# Patient Record
Sex: MALE | Race: WHITE | NOT HISPANIC OR LATINO | URBAN - METROPOLITAN AREA
[De-identification: names, ages, dates, MRNs, and addresses within clinical notes are randomized per-mention and may not be internally consistent; named-entity substitution may affect disease eponyms.]

---

## 2017-07-19 ENCOUNTER — HOSPITAL ENCOUNTER (EMERGENCY)
Facility: CLINIC | Age: 52
Discharge: HOME | End: 2017-07-19
Attending: FAMILY MEDICINE | Admitting: FAMILY MEDICINE
Payer: COMMERCIAL

## 2017-07-19 VITALS
DIASTOLIC BLOOD PRESSURE: 85 MMHG | TEMPERATURE: 97.8 F | SYSTOLIC BLOOD PRESSURE: 131 MMHG | HEART RATE: 77 BPM | RESPIRATION RATE: 16 BRPM | BODY MASS INDEX: 21.26 KG/M2 | WEIGHT: 157 LBS | OXYGEN SATURATION: 99 % | HEIGHT: 72 IN

## 2017-07-19 DIAGNOSIS — M54.50 CHRONIC LOW BACK PAIN WITHOUT SCIATICA, UNSPECIFIED BACK PAIN LATERALITY: ICD-10-CM

## 2017-07-19 DIAGNOSIS — F32.A DEPRESSION, UNSPECIFIED DEPRESSION TYPE: ICD-10-CM

## 2017-07-19 DIAGNOSIS — G89.29 CHRONIC LOW BACK PAIN WITHOUT SCIATICA, UNSPECIFIED BACK PAIN LATERALITY: ICD-10-CM

## 2017-07-19 LAB
AMPHETAMINES UR QL SCN: NORMAL
BARBITURATES UR QL: NORMAL
BENZODIAZ UR QL: NORMAL
CANNABINOIDS UR QL SCN: NORMAL
COCAINE UR QL: NORMAL
ETHANOL UR QL SCN: NORMAL
OPIATES UR QL SCN: NORMAL

## 2017-07-19 PROCEDURE — 80307 DRUG TEST PRSMV CHEM ANLYZR: CPT | Performed by: FAMILY MEDICINE

## 2017-07-19 PROCEDURE — 90791 PSYCH DIAGNOSTIC EVALUATION: CPT

## 2017-07-19 PROCEDURE — 99285 EMERGENCY DEPT VISIT HI MDM: CPT | Mod: 25 | Performed by: FAMILY MEDICINE

## 2017-07-19 PROCEDURE — 80320 DRUG SCREEN QUANTALCOHOLS: CPT | Performed by: FAMILY MEDICINE

## 2017-07-19 PROCEDURE — 99284 EMERGENCY DEPT VISIT MOD MDM: CPT | Mod: Z6 | Performed by: FAMILY MEDICINE

## 2017-07-19 RX ORDER — DULOXETIN HYDROCHLORIDE 20 MG/1
20 CAPSULE, DELAYED RELEASE ORAL 2 TIMES DAILY
Qty: 60 CAPSULE | Refills: 0 | Status: SHIPPED | OUTPATIENT
Start: 2017-07-19 | End: 2022-05-10

## 2017-07-19 ASSESSMENT — ENCOUNTER SYMPTOMS
DYSPHORIC MOOD: 1
NERVOUS/ANXIOUS: 1
FEVER: 0
SHORTNESS OF BREATH: 0
ABDOMINAL PAIN: 0

## 2017-07-19 NOTE — ED AVS SNAPSHOT
[facility], [place], Emergency Department    [address]    [facility] [address]    Phone:  [phone]    Fax:  [phone]                                       Uriah Mo   MRN: [id number]    Department:  [facility], Emergency Department   Date of Visit:  7/19/2017           After Visit Summary Signature Page     I have received my discharge instructions, and my questions have been answered. I have discussed any challenges I see with this plan with the nurse or doctor.    ..........................................................................................................................................  Patient/Patient Representative Signature      ..........................................................................................................................................  Patient Representative Print Name and Relationship to Patient    ..................................................               ................................................  Date                                            Time    ..........................................................................................................................................  Reviewed by Signature/Title    ...................................................              ..............................................  Date                                                            Time

## 2017-07-19 NOTE — DISCHARGE INSTRUCTIONS
Discharged with plans to start both medication and therapy as well as reestablishing with primary care doctor as discussed.

## 2017-07-19 NOTE — ED NOTES
"Pt was at the clinic due to chronic back pain. According to patient, \"I was at the clinic and explained to my Doctor the pain I have been feeling. I have had shoulder pain that radiates to my back and leg for many years. My family requested and feel Botox injection will help with my pain. My Doctor does not think that is what I need. I stated, I would jump in front of a bus due to the pain I am having. I cannot take it anymore  I was sent to the ER due to suicidal thought and I still feel I my pain cannot be controlled, I would rather die.\"  "

## 2017-07-19 NOTE — ED PROVIDER NOTES
"  History     Chief Complaint   Patient presents with     Suicidal     HPI  Uriah Mo is a 52 year old male who presents to emergency room with concerns about ongoing chronic depression and anxiety as well as chronic back pain issues.  Patient admitted his statement and his primary care clinic that he had to continue to live with this back pain that he might as well just jump into traffic.  Patient denies any actual suicidal ideation and states that he made the comment is a flippant remark and did not intend to communicate any sort of suicidal ideation.  Patient does acknowledge that he has ongoing depression and anxiety and would like therapy he also is requesting the possibility of starting medication that would help both with his chronic back pain as well as his underlying depression.    I have reviewed the Medications, Allergies, Past Medical and Surgical History, and Social History in the Epic system.    PERSONAL MEDICAL HISTORY  Past Medical History:   Diagnosis Date     Anxiety     \"As a child. Maybe it is caused by pain.\"     PAST SURGICAL HISTORY  History reviewed. No pertinent surgical history.  FAMILY HISTORY  No family history on file.  SOCIAL HISTORY  Social History   Substance Use Topics     Smoking status: Current Every Day Smoker     Packs/day: 2.00     Smokeless tobacco: Not on file      Comment: Smoked for 34 years plus     Alcohol use Yes      Comment: Whisky 6 shots/daily     MEDICATIONS  No current facility-administered medications for this encounter.      Current Outpatient Prescriptions   Medication     DULoxetine (CYMBALTA) 20 MG EC capsule     ALLERGIES  No Known Allergies      Review of Systems   Constitutional: Negative for fever.   Respiratory: Negative for shortness of breath.    Cardiovascular: Negative for chest pain.   Gastrointestinal: Negative for abdominal pain.   Psychiatric/Behavioral: Positive for dysphoric mood. The patient is nervous/anxious.    All other systems reviewed " and are negative.      Physical Exam   BP: (!) 151/100  Pulse: 75  Temp: 97.8  F (36.6  C)  Resp: 20  Height: 182.9 cm (6')  Weight: 71.2 kg (157 lb)  SpO2: 100 %  Physical Exam   Constitutional: No distress.   HENT:   Head: Atraumatic.   Mouth/Throat: Oropharynx is clear and moist. No oropharyngeal exudate.   Eyes: Pupils are equal, round, and reactive to light. No scleral icterus.   Cardiovascular: Normal heart sounds and intact distal pulses.    Pulmonary/Chest: Breath sounds normal. No respiratory distress.   Abdominal: Soft. Bowel sounds are normal. There is no tenderness.   Musculoskeletal:        Lumbar back: He exhibits pain and spasm.   Skin: Skin is warm. No rash noted. He is not diaphoretic.       ED Course     ED Course     Procedures     Patient was seen by the  please refer to their report in the notes section of the Epic chart dated 7/19/17      Critical Care time:  none      Assessments & Plan (with Medical Decision Making)       I have reviewed the nursing notes.    I have reviewed the findings, diagnosis, plan and need for follow up with the patient.  Patient with ongoing depression and chronic low back pain at this time will be started on Cymbalta we made arrangements for him to start therapy and also given him information on establishing a new primary care doctor has discussed the patient will return to the emergency room if there is any risk of harming himself.    New Prescriptions    DULOXETINE (CYMBALTA) 20 MG EC CAPSULE    Take 1 capsule (20 mg) by mouth 2 times daily       Final diagnoses:   Depression, unspecified depression type   Chronic low back pain without sciatica, unspecified back pain laterality       7/19/2017   [facility], EMERGENCY DEPARTMENT     Philipp Cabrera MD  07/19/17 [id number]

## 2017-07-19 NOTE — ED AVS SNAPSHOT
[facility], Emergency Department    [address] [place] [address]    Phone:  [phone]    Fax:  [phone]                                       Uriah Mo   MRN: [id number]    Department:  [facility], Emergency Department   Date of Visit:  7/19/2017           Patient Information     Date Of Birth          1965        Your diagnoses for this visit were:     Depression, unspecified depression type     Chronic low back pain without sciatica, unspecified back pain laterality        You were seen by Philipp Cabrera MD.      Follow-up Information     Follow up with Khari Humphrey MD.    Specialty:  Internal Medicine    Contact information:     [facility]  [address]  [facility] [address] [phone]          Follow up with Therapy as scheduled.        Discharge Instructions       Discharged with plans to start both medication and therapy as well as reestablishing with primary care doctor as discussed.    24 Hour Appointment Hotline       To make an appointment at any [facility] clinic, call [phone] ([phone]). If you don't have a family doctor or clinic, we will help you find one. [facility] clinics are conveniently located to serve the needs of you and your family.             Review of your medicines      START taking        Dose / Directions Last dose taken    DULoxetine 20 MG EC capsule   Commonly known as:  CYMBALTA   Dose:  20 mg   Quantity:  60 capsule        Take 1 capsule (20 mg) by mouth 2 times daily   Refills:  0                Prescriptions were sent or printed at these locations (1 Prescription)                   Other Prescriptions                Printed at Department/Unit printer (1 of 1)         DULoxetine (CYMBALTA) 20 MG EC capsule                Procedures and tests performed during your visit     Drug abuse screen 6 urine (chem dep)      Orders Needing Specimen Collection     None      Pending Results     Date and Time Order Name  "Status Description    2017 1642 Drug abuse screen 6 urine (chem dep) In process             Pending Culture Results     Date and Time Order Name Status Description    2017 1642 Drug abuse screen 6 urine (chem dep) In process             Pending Results Instructions     If you had any lab results that were not finalized at the time of your Discharge, you can call the ED Lab Result RN at [phone]. You will be contacted by this team for any positive Lab results or changes in treatment. The nurses are available 7 days a week from 10A to 6:30P.  You can leave a message 24 hours per day and they will return your call.        Thank you for choosing [facility]       Thank you for choosing [facility] for your care. Our goal is always to provide you with excellent care. Hearing back from our patients is one way we can continue to improve our services. Please take a few minutes to complete the written survey that you may receive in the mail after you visit with us. Thank you!        [employer] Information     [employer] lets you send messages to your doctor, view your test results, renew your prescriptions, schedule appointments and more. To sign up, go to [email or web address] . Click on \"Log in\" on the left side of the screen, which will take you to the Welcome page. Then click on \"Sign up Now\" on the right side of the page.     You will be asked to enter the access code listed below, as well as some personal information. Please follow the directions to create your username and password.     Your access code is: [email or web address]  Expires: 10/17/2017  6:13 PM     Your access code will  in 90 days. If you need help or a new code, please call your [facility] clinic or [phone].        Care EveryWhere ID     This is your Care EveryWhere ID. This could be used by other organizations to access your [facility] medical records  [id number]        Equal Access to Services     ASHOK JUAREZ: Brannon Rick, " ba aleman, sulema gonzalezmaursula ramos, tex barron ah. So [facility] [phone].    ATENCIÓN: Si habla español, tiene a oconnor disposición servicios gratuitos de asistencia lingüística. Llame al [phone].    We comply with applicable federal civil rights laws and [place] laws. We do not discriminate on the basis of race, color, national origin, age, disability sex, sexual orientation or gender identity.            After Visit Summary       This is your record. Keep this with you and show to your community pharmacist(s) and doctor(s) at your next visit.

## 2022-05-10 ENCOUNTER — OFFICE VISIT (OUTPATIENT)
Dept: INTERNAL MEDICINE | Facility: CLINIC | Age: 57
End: 2022-05-10
Payer: COMMERCIAL

## 2022-05-10 VITALS
HEART RATE: 91 BPM | WEIGHT: 169.2 LBS | SYSTOLIC BLOOD PRESSURE: 154 MMHG | BODY MASS INDEX: 22.92 KG/M2 | HEIGHT: 72 IN | DIASTOLIC BLOOD PRESSURE: 99 MMHG | OXYGEN SATURATION: 100 %

## 2022-05-10 DIAGNOSIS — G89.29 CHRONIC RIGHT SHOULDER PAIN: Primary | ICD-10-CM

## 2022-05-10 DIAGNOSIS — M54.6 CHRONIC RIGHT-SIDED THORACIC BACK PAIN: ICD-10-CM

## 2022-05-10 DIAGNOSIS — L30.9 DERMATITIS: ICD-10-CM

## 2022-05-10 DIAGNOSIS — M25.511 CHRONIC RIGHT SHOULDER PAIN: Primary | ICD-10-CM

## 2022-05-10 DIAGNOSIS — G89.29 CHRONIC RIGHT-SIDED THORACIC BACK PAIN: ICD-10-CM

## 2022-05-10 PROCEDURE — 99204 OFFICE O/P NEW MOD 45 MIN: CPT | Mod: GC

## 2022-05-10 RX ORDER — BETAMETHASONE DIPROPIONATE 0.5 MG/G
CREAM TOPICAL 2 TIMES DAILY
Qty: 45 G | Refills: 0 | Status: SHIPPED | OUTPATIENT
Start: 2022-05-10 | End: 2022-12-05 | Stop reason: ALTCHOICE

## 2022-05-10 RX ORDER — CYCLOBENZAPRINE HCL 5 MG
5 TABLET ORAL 2 TIMES DAILY PRN
Qty: 15 TABLET | Refills: 0 | Status: SHIPPED | OUTPATIENT
Start: 2022-05-10 | End: 2022-05-24

## 2022-05-10 NOTE — PROGRESS NOTES
I, Socrates Mayer MD saw the patient with the resident, and agree with the resident's findings and plan of care as documented in the resident's note.  BP (!) 154/99 (BP Location: Right arm, Patient Position: Sitting, Cuff Size: Adult Regular)   Pulse 91   Ht 1.829 m (6')   Wt 76.7 kg (169 lb 3.2 oz)   SpO2 100%   BMI 22.95 kg/m    I personally reviewed vital signs and past record.  Key findings: Right sided thoracic/shoulder pain since 2005, thought to be related to a work problem and subsequent injuries. Has seen several specialists including ortho, pain. Recently has gone to . MRI C-spine and shoulder not revealing. PT and pool therapy, but some limitations in insurance payment.  Ongoing issues with work/disability, we are unable to comment but would defer to a disability insurance.  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 Some purplish cast, but elbows are benign.

## 2022-05-10 NOTE — NURSING NOTE
Chief Complaint   Patient presents with     Recheck Medication     Musculoskeletal Problem     R shoulder/back injury in 2004; did some PT over the years, kept getting worse and no one could diagnose; Patient states there is a lot of traction the worse it gets.        Miguel Wilson, EMT at 7:27 AM on 5/10/2022.

## 2022-05-14 ENCOUNTER — HEALTH MAINTENANCE LETTER (OUTPATIENT)
Age: 57
End: 2022-05-14

## 2022-05-19 ENCOUNTER — ANCILLARY PROCEDURE (OUTPATIENT)
Dept: GENERAL RADIOLOGY | Facility: CLINIC | Age: 57
End: 2022-05-19
Attending: PEDIATRICS
Payer: COMMERCIAL

## 2022-05-19 ENCOUNTER — TELEPHONE (OUTPATIENT)
Dept: INTERNAL MEDICINE | Facility: CLINIC | Age: 57
End: 2022-05-19

## 2022-05-19 ENCOUNTER — ANCILLARY PROCEDURE (OUTPATIENT)
Dept: MRI IMAGING | Facility: CLINIC | Age: 57
End: 2022-05-19
Attending: PEDIATRICS
Payer: COMMERCIAL

## 2022-05-19 DIAGNOSIS — G89.29 CHRONIC RIGHT SHOULDER PAIN: ICD-10-CM

## 2022-05-19 DIAGNOSIS — M25.511 CHRONIC RIGHT SHOULDER PAIN: ICD-10-CM

## 2022-05-19 DIAGNOSIS — G89.29 CHRONIC RIGHT-SIDED THORACIC BACK PAIN: ICD-10-CM

## 2022-05-19 DIAGNOSIS — M54.6 CHRONIC RIGHT-SIDED THORACIC BACK PAIN: ICD-10-CM

## 2022-05-19 LAB — RADIOLOGIST FLAGS: NORMAL

## 2022-05-19 PROCEDURE — A9585 GADOBUTROL INJECTION: HCPCS | Performed by: STUDENT IN AN ORGANIZED HEALTH CARE EDUCATION/TRAINING PROGRAM

## 2022-05-19 PROCEDURE — 73030 X-RAY EXAM OF SHOULDER: CPT | Mod: RT | Performed by: RADIOLOGY

## 2022-05-19 PROCEDURE — 72157 MRI CHEST SPINE W/O & W/DYE: CPT | Performed by: STUDENT IN AN ORGANIZED HEALTH CARE EDUCATION/TRAINING PROGRAM

## 2022-05-19 RX ORDER — GADOBUTROL 604.72 MG/ML
7.5 INJECTION INTRAVENOUS ONCE
Status: COMPLETED | OUTPATIENT
Start: 2022-05-19 | End: 2022-05-19

## 2022-05-19 RX ADMIN — GADOBUTROL 7.5 ML: 604.72 INJECTION INTRAVENOUS at 09:59

## 2022-05-19 NOTE — TELEPHONE ENCOUNTER
[facility] Call Center    Phone Message    May a detailed message be left on voicemail: yes     Reason for Call: Other: Kari calling from imaging due to an incidental finding on the MRI of thoracic spine. Please call back to discuss ASAP. Thank you     Action Taken: Message routed to:  [facility]    Travel Screening: Not Applicable

## 2022-05-19 NOTE — DISCHARGE INSTRUCTIONS
MRI Contrast Discharge Instructions    The IV contrast you received today will pass out of your body in your  urine. This will happen in the next 24 hours. You will not feel this process.  Your urine will not change color.    Drink at least 4 extra glasses of water or juice today (unless your doctor  has restricted your fluids). This reduces the stress on your kidneys.  You may take your regular medicines.    If you are on dialysis: It is best to have dialysis today.    If you have a reaction: Most reactions happen right away. If you have  any new symptoms after leaving the hospital (such as hives or swelling),  call your hospital at the correct number below. Or call your family doctor.  If you have breathing distress or wheezing, call 911.    Special instructions: ***    I have read and understand the above information.    Signature:______________________________________ Date:___________    Staff:__________________________________________ Date:___________     Time:__________    [facility] Departments:    ___[place]: [phone]  ___[facility]: [phone]  ___[place]: [phone] ___[facility]: [phone]  ___[facility]: [phone]  ___[facility]: [phone]  ___[phone]  ___[facility]: [phone]  ___[phone]

## 2022-05-22 NOTE — PROGRESS NOTES
PRIMARY CARE CENTER     Patient Name: Uriah Mo  YOB: 1965  MRN: [id number]    Date of Service: May 24, 2022  Chief Complaint: back pain f/u         HISTORY OF PRESENT ILLNESS:    Dorian Mo is a 57 yo man with PMHx depression, anxiety, chronic R-sided shoulder/back pain who presents today from f/u.     He was last seen by me 5/10/2022 for this chronic R shoulder, upper back, and chest wall pain/weakness after a remote work injury with subsequent worsening/re-injuries over the year. Please see 5/10 office visit for more details. At that time, we opted to move forward with thoracic spine MRI and R shoulder XR, and provided Rxs for voltaren gel and cyclobenzaprine.    The MRI thoracic spine showed a normal thoracic spine MRI without MRI finding to explain back pain. The right shoulder XR showed mild degenerative changes of his AC joint as well as a 8 mm subacromial enthesophyte. There was no substantial glenohumeral joint degenerative changes.    Of note, the MRI also caught an incidental finding of a well-defined enhancing T2 hyperintense lesion in the liver that is more likely a benign hemangioma but they recommended a non-emergent ultrasound for confirmation. Discussed with patient and he is in agreement with this plan.     Since our last appointment, Dorian feels improved. He is very relieved there was nothing concerning on MRI or XR. He started his own stretching routine that focuses on back extension and shoulder abduction then posterior rotation, as well as cat & cow exercises. He is unsure if muscle relaxant helping or not, if it is allowing him to stretch more versus it is not changing much. He would like to continue using it, has been using it twice per day.     He feels his rash is improved, thinks it could be related to not irritating the area so much. He reports similar rash on his scalp in an area he also frequently itches. It looks similar to the lesion that was on his arm.     He  "also has a skin lesion under his R eye he was concerned about. He reports it has previously flaked off but is still present. No bleeding.     Medications and allergies reviewed by me today.          PAST MEDICAL HISTORY:     Past Medical History:   Diagnosis Date     Anxiety     \"As a child. Maybe it is caused by pain.\"            REVIEW OF SYSTEMS:     10 point ROS was negative except as noted in HPI         HOME MEDICATIONS:     Current Outpatient Medications   Medication     betamethasone dipropionate (DIPROSONE) 0.05 % external cream     cyclobenzaprine (FLEXERIL) 5 MG tablet     diclofenac (VOLTAREN) 1 % topical gel     No current facility-administered medications for this visit.            PHYSICAL EXAM:   Vitals: BP (!) 138/96 (BP Location: Right arm, Patient Position: Sitting, Cuff Size: Adult Regular)   Pulse 95   Resp 16   Ht 1.829 m (6')   Wt 78.2 kg (172 lb 6.4 oz)   SpO2 97%   BMI 23.38 kg/m       Wt Readings from Last 4 Encounters:   05/10/22 76.7 kg (169 lb 3.2 oz)   07/19/17 71.2 kg (157 lb)       GENERAL: Alert, interactive, NAD  HEENT: NCAT, EOMI  LUNGS: Breathing comfortably on RA  HEART: regular rate, WWP  MUSCULOSKELETAL: normal spinal extension ROM  EXTREMITIES: No LE edema bilaterally  SKIN: Warm and dry. Has flat irregularly shaped light brown coored lesion <1 cm in size under R eye. Inflamed skin on back of head at base of neck with excoriations.   NEURO: A&O, moving all 4 limbs spontaneously   PSYCH: Appropriate mood, normal speech, linear thought.            DATA:     Laboratory Data:  No relevant lab data.     Imaging:  MRI thoracic w/wo contrast 5/19 without spinal abnormality. Incidental finding of liver lesion likely representing benign hemangioma.    Shoulder XR 5/19 with mild AC degenerative changes, 8 mm bone spur.           ASSESSMENT & PLAN:     Uriah was seen today for recheck and results.    Diagnoses and all orders for this visit:    Probable benign " hemangioma  Incidental finding on MRI thoracic spine of probable benign hemangioma. Radiologist suggested abdominal US to confirm diagnosis.  - Abdominal US limited    Chronic right-sided thoracic back pain  Improved from prior. Patient would like to continue with cyclobenzaprine. Recommended using it only at night PRN instead of BID. He would prefer to continue working on exercises alone rather than a PT referral. Will contact clinic if he changes his mind. Provided patient education on hip & low back exercises per [employer].   -     cyclobenzaprine (FLEXERIL) 5 MG tablet; Take 1 tablet (5 mg) by mouth nightly as needed for muscle spasms    Skin lesion  Has flat seborrheic keratosis appearing lesion under his R eye with irregular border. Low suspicion for malignancy at this time but would recommend Dermatology referral for further evaluation and management.   -     Adult Dermatology Referral    Dermatitis  Similar dermatitis appearing lesion in area of frequent itching at base of neck under hair. Recommended mometasone until lesion improves.   -     mometasone (ELOCON) 0.1 % external solution; Apply topically daily    Healthcare Maintenance:  Dorian has not had a routine preventive visit in >5 years per patient. He would like to focus on his back pain improving at this time but will call to schedule a preventive visit in the near future    Return to clinic: when able for annual visit    Patient care plan discussed with attending physician, Dr. Mayer, who agreed with above.    Lazara Lewis MD  Internal Medicine, PGY-1

## 2022-05-24 ENCOUNTER — OFFICE VISIT (OUTPATIENT)
Dept: INTERNAL MEDICINE | Facility: CLINIC | Age: 57
End: 2022-05-24

## 2022-05-24 ENCOUNTER — ANCILLARY PROCEDURE (OUTPATIENT)
Dept: ULTRASOUND IMAGING | Facility: CLINIC | Age: 57
End: 2022-05-24
Attending: PEDIATRICS
Payer: COMMERCIAL

## 2022-05-24 VITALS
RESPIRATION RATE: 16 BRPM | OXYGEN SATURATION: 97 % | WEIGHT: 172.4 LBS | DIASTOLIC BLOOD PRESSURE: 96 MMHG | HEIGHT: 72 IN | BODY MASS INDEX: 23.35 KG/M2 | SYSTOLIC BLOOD PRESSURE: 138 MMHG | HEART RATE: 95 BPM

## 2022-05-24 DIAGNOSIS — M54.6 CHRONIC RIGHT-SIDED THORACIC BACK PAIN: ICD-10-CM

## 2022-05-24 DIAGNOSIS — K76.9 LESION OF LIVER: ICD-10-CM

## 2022-05-24 DIAGNOSIS — L98.9 SKIN LESION: ICD-10-CM

## 2022-05-24 DIAGNOSIS — L30.9 DERMATITIS: ICD-10-CM

## 2022-05-24 DIAGNOSIS — K76.9 LESION OF LIVER: Primary | ICD-10-CM

## 2022-05-24 DIAGNOSIS — G89.29 CHRONIC RIGHT-SIDED THORACIC BACK PAIN: ICD-10-CM

## 2022-05-24 PROCEDURE — 76705 ECHO EXAM OF ABDOMEN: CPT | Mod: GC | Performed by: RADIOLOGY

## 2022-05-24 PROCEDURE — 99214 OFFICE O/P EST MOD 30 MIN: CPT | Mod: GC

## 2022-05-24 RX ORDER — MOMETASONE FUROATE 1 MG/ML
SOLUTION TOPICAL DAILY
Qty: 60 ML | Refills: 0 | Status: SHIPPED | OUTPATIENT
Start: 2022-05-24 | End: 2022-12-05 | Stop reason: ALTCHOICE

## 2022-05-24 RX ORDER — CYCLOBENZAPRINE HCL 5 MG
5 TABLET ORAL
Qty: 15 TABLET | Refills: 0 | Status: SHIPPED | OUTPATIENT
Start: 2022-05-24

## 2022-05-24 ASSESSMENT — PAIN SCALES - GENERAL: PAINLEVEL: EXTREME PAIN (8)

## 2022-05-24 NOTE — PATIENT INSTRUCTIONS
Hip & back exercises:  https://orthoinfo.aaos.org/en/recovery/hip-conditioning-program/    https://orthoinfo.aaos.org/globalassets/pdfs/2017-rehab_spine.pdf

## 2022-05-24 NOTE — PROGRESS NOTES
I, Socrates Mayer MD saw the patient with the resident, and agree with the resident's findings and plan of care as documented in the resident's note.  BP (!) 138/96 (BP Location: Right arm, Patient Position: Sitting, Cuff Size: Adult Regular)   Pulse 95   Resp 16   Ht 1.829 m (6')   Wt 78.2 kg (172 lb 6.4 oz)   SpO2 97%   BMI 23.38 kg/m    I personally reviewed vital signs and past record.  Key findings: Following up from complex visit recently.  Imaging of thoracic spine reliable. Needs repeat of incidental finding in liver.  XR of shoulder showed bony spur.   OK to take muscle relaxers at night to recovery.

## 2022-05-24 NOTE — Clinical Note
Julio Haile, Just a reminder to code level 4 LOS for patients with multiple problems and Rx meds. Otherwise, keep up the good work! Dionte

## 2022-05-24 NOTE — NURSING NOTE
Uriah Mo is a 56 year old male patient that presents today in clinic for the following:    Chief Complaint   Patient presents with     RECHECK     Pt comes into clinic for a follow up     Results     Discuss imaging results     The patient's allergies and medications were reviewed as noted. A set of vitals were recorded as noted without incident. The patient does not have any other questions for the provider.    Alexandra Casiano, EMT at 12:51 PM on 5/24/2022

## 2022-06-04 ENCOUNTER — TELEPHONE (OUTPATIENT)
Dept: INTERNAL MEDICINE | Facility: CLINIC | Age: 57
End: 2022-06-04

## 2022-08-08 ENCOUNTER — TELEPHONE (OUTPATIENT)
Dept: INTERNAL MEDICINE | Facility: CLINIC | Age: 57
End: 2022-08-08

## 2022-08-08 DIAGNOSIS — G89.29 CHRONIC RIGHT-SIDED THORACIC BACK PAIN: ICD-10-CM

## 2022-08-08 DIAGNOSIS — M54.6 CHRONIC RIGHT-SIDED THORACIC BACK PAIN: ICD-10-CM

## 2022-08-08 NOTE — TELEPHONE ENCOUNTER
[facility] Call Center    Phone Message    May a detailed message be left on voicemail: yes     Reason for Call: Order(s): Other: xray  Reason for requested: back and shoulder pain  Date needed: asap  Provider name: Dr. Lewis    Patient reporting his shoulder issues are still bothering him. Muscle relaxants not helping and might be making it worse. Wanting x-ray results from [facility] to be reviewed and wondering about getting another x-ray to compare how he has progressed. Patient noted that the Pain specialist at [facility] looked at his T4 and T5. Patient noted at the last visit, Dr. Lewis looked more at T1 and T2.       Action Taken: Message routed to:  [facility]    Travel Screening: Not Applicable

## 2022-08-09 NOTE — TELEPHONE ENCOUNTER
Patient says that he feels lightheaded from pain sometimes and it radiates to hip. Exacerbated sleeping on back. Sleeping on side can help but had stoped taking flexeril and ibuprofen but writer recommended for him to start back up. 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 Will reorder. Had incident at [employer] on right shoulder. Wants to pursue xray- concerned about a cyst or back injury. Endorse rashes and linear bruises sometimes on arms when he injures himself.     RN encouraged patient to discuss these concerns with provider when he visits, and will follow up with RN if his pain gets worse.     Jacobo Navarro RN on 8/9/2022 at 1:56 PM

## 2022-08-09 NOTE — TELEPHONE ENCOUNTER
Patient had xray shoulder and xray thoracic spine in 2017 at [facility].    Dr. Lewis placed order for xray shoulder and MRI thoracic spine in May, 2022.  These results was discussed at his visit with Dr. Lewis in May.   Not sure why patient is wanting another imaging recheck when this was done 3 months ago.    Patient has a future appointment with Dr. Lewis on 08/23/22.    Message sent to RN to advise his shoulder pain until his appointment.  Flexeril not working.          Sanjeev Hope CMA ([facility]) at 10:11 AM on 8/9/2022

## 2022-08-22 NOTE — PROGRESS NOTES
"  [facility]     Patient Name: Uriah Mo  YOB: 1965  MRN: [id number]    Date of Service: 2022    Chief Complaint: \"Annual physical, back and shoulder issues still bothering him, muscle relaxants not helping and might be making it worse, wanting x rays from [facility] to be reviewed, pain specialist in the past looked at his T4 and T5, wondering about getting another x ray to compare how he has progressed per patient\"         HISTORY OF PRESENT ILLNESS:    Dorian Mo is a 56 yo man with PMHx depression, anxiety, chronic R-sided shoulder/back pain who presents today for annual visit and follow-up on back and shoulder issues.    Regarding his back, he reports the same pain is getting worse again. No re-injury. He wonders if it could be rooted in issues he had with a lawn  he had as a child that started his pain, and then continued to get re-injured in his job as previously discussed. Dorian thinks he has 6 months of pool therapy still through [facility]. However, agreed to PT referral in case this has .     Otherwise, would like a preventive visit today.   Healthcare Maintenance Due  - Colon cancer screening -- pt prefers FIT testing. Colon cancer doesn't run in family.   - Labs: HIV screening, HCV screening, lipid profile (has been fasting 18 hours)  - Vaccines: Pneumonia vaccine (had PPSV23 2011); COVID booster (Moderna); Tdap  - Zoster vaccine - previously had shingles ~age 32. Recommended getting Shingrix when able at his local pharmacy  - Lung cancer screening: currently smokes 0.5 PPD   - Discussed smoking cessation -- pt would like to discuss more with his girlfriend as he thinks they should both try to quit together. She previously had success with Chantix. He will reach out if he would like to pursue smoking cessation or else we will discuss more at our next visit.     Medications and allergies reviewed by me today.          PAST MEDICAL HISTORY: " "    Past Medical History:   Diagnosis Date     Anxiety     \"As a child. Maybe it is caused by pain.\"            REVIEW OF SYSTEMS:     10 point ROS was negative except as noted in HPI         HOME MEDICATIONS:     Current Outpatient Medications   Medication     betamethasone dipropionate (DIPROSONE) 0.05 % external cream     cyclobenzaprine (FLEXERIL) 5 MG tablet     diclofenac (VOLTAREN) 1 % topical gel     mometasone (ELOCON) 0.1 % external solution     No current facility-administered medications for this visit.            PHYSICAL EXAM:   Vitals: BP (!) 151/95 (BP Location: Right arm, Patient Position: Sitting, Cuff Size: Adult Regular)   Pulse 96   Resp 18   Ht 1.829 m (6')   Wt 74.4 kg (164 lb)   SpO2 98%   BMI 22.24 kg/m      Wt Readings from Last 4 Encounters:   05/24/22 78.2 kg (172 lb 6.4 oz)   05/10/22 76.7 kg (169 lb 3.2 oz)   07/19/17 71.2 kg (157 lb)     GENERAL: Alert, interactive, NAD  HEENT: NCAT, EOMI  LUNGS: clear to auscultation bilaterally, no crackles or wheezes  HEART: regular rate and rhythm, normal S1 and S2, no murmur appreciated  EXTREMITIES: No LE edema bilaterally  SKIN: Warm and dry, no jaundice or acute rashes  NEURO: A&O, moving all 4 limbs spontaneously   PSYCH: Appropriate mood, normal speech, linear thought.            DATA:     Laboratory Data & Imaging: no recent labs/imaging           ASSESSMENT & PLAN:     Uriah was seen today for physical and musculoskeletal problem.    Diagnoses and all orders for this visit:    Chronic right-sided thoracic back pain  Continue to have his R-sided thoracic pain. Recently had the thoracic MRI that was normal, provided reassurance. Recommended he try PT for his pain. Also offered referral to ortho/PM&R if pain does not improve. He thinks he may still have 6 months of pool therapy, or else he will pursue PT. He will let us know if he would like a specialist referral if pain doesn't improve.   -     Physical Therapy Referral; " Future    Screening for diabetes mellitus  -     Hemoglobin A1c; Future    Elevated blood pressure reading without diagnosis of hypertension  -     Comprehensive metabolic panel; Future    Screening for hyperlipidemia  -     Lipid panel reflex to direct LDL Fasting; Future    Screening for HIV (human immunodeficiency virus)  -     HIV Antigen Antibody Combo; Future    Encounter for HCV screening test for low risk patient  -     HCV Screen with Reflex; Future    Colon cancer screening  Pt would like to avoid colonoscopy as able. Informed him he would need a colonoscopy if FIT was positive, he was ok with this.   -     Fecal cancer screen FIT; Future    Current tobacco use  Encounter for screening for lung cancer  Pt currently smokes 0.5 PPD. Asked if pt was interested in smoking cessation. He will discuss with his girlfriend, as she smokes too, and see if they would like to quit together. His girlfriend has tried Chantix in the past with success. He will reach out if in the meantime he would like to pursue smoking cessation.   -     CT Chest Lung Cancer Scrn Low Dose wo; Future    Other orders  -     TDAP VACCINE (Adacel, Boostrix)  [[id number]]  -     PNEUMOCOCCAL 20 VALENT CONJUGATE (PREVNAR 20)  -     COVID-19,PF,MODERNA (18+ YRS BOOSTER .25ML)      Return to clinic: 1 year for annual visit or sooner if concerns arise     Patient care plan discussed with attending physician, Dr. Abarca, who agreed with above.    Lazara Lewis MD  PGY-2  Internal Medicine

## 2022-08-23 ENCOUNTER — OFFICE VISIT (OUTPATIENT)
Dept: INTERNAL MEDICINE | Facility: CLINIC | Age: 57
End: 2022-08-23
Payer: COMMERCIAL

## 2022-08-23 ENCOUNTER — ANCILLARY PROCEDURE (OUTPATIENT)
Dept: CT IMAGING | Facility: CLINIC | Age: 57
End: 2022-08-23
Attending: INTERNAL MEDICINE
Payer: COMMERCIAL

## 2022-08-23 ENCOUNTER — LAB (OUTPATIENT)
Dept: LAB | Facility: CLINIC | Age: 57
End: 2022-08-23

## 2022-08-23 VITALS
HEART RATE: 96 BPM | RESPIRATION RATE: 18 BRPM | SYSTOLIC BLOOD PRESSURE: 151 MMHG | WEIGHT: 164 LBS | DIASTOLIC BLOOD PRESSURE: 95 MMHG | BODY MASS INDEX: 22.21 KG/M2 | OXYGEN SATURATION: 98 % | HEIGHT: 72 IN

## 2022-08-23 DIAGNOSIS — Z11.59 ENCOUNTER FOR HCV SCREENING TEST FOR LOW RISK PATIENT: ICD-10-CM

## 2022-08-23 DIAGNOSIS — Z12.11 COLON CANCER SCREENING: ICD-10-CM

## 2022-08-23 DIAGNOSIS — Z13.1 SCREENING FOR DIABETES MELLITUS: ICD-10-CM

## 2022-08-23 DIAGNOSIS — Z12.2 ENCOUNTER FOR SCREENING FOR LUNG CANCER: ICD-10-CM

## 2022-08-23 DIAGNOSIS — R03.0 ELEVATED BLOOD PRESSURE READING WITHOUT DIAGNOSIS OF HYPERTENSION: ICD-10-CM

## 2022-08-23 DIAGNOSIS — G89.29 CHRONIC RIGHT-SIDED THORACIC BACK PAIN: Primary | ICD-10-CM

## 2022-08-23 DIAGNOSIS — M54.6 CHRONIC RIGHT-SIDED THORACIC BACK PAIN: Primary | ICD-10-CM

## 2022-08-23 DIAGNOSIS — Z11.4 SCREENING FOR HIV (HUMAN IMMUNODEFICIENCY VIRUS): ICD-10-CM

## 2022-08-23 DIAGNOSIS — Z13.220 SCREENING FOR HYPERLIPIDEMIA: ICD-10-CM

## 2022-08-23 LAB
ALBUMIN SERPL-MCNC: 4.1 G/DL (ref 3.4–5)
ALP SERPL-CCNC: 57 U/L (ref 40–150)
ALT SERPL W P-5'-P-CCNC: 36 U/L (ref 0–70)
ANION GAP SERPL CALCULATED.3IONS-SCNC: 7 MMOL/L (ref 3–14)
AST SERPL W P-5'-P-CCNC: 31 U/L (ref 0–45)
BILIRUB SERPL-MCNC: 0.6 MG/DL (ref 0.2–1.3)
BUN SERPL-MCNC: 13 MG/DL (ref 7–30)
CALCIUM SERPL-MCNC: 9.2 MG/DL (ref 8.5–10.1)
CHLORIDE BLD-SCNC: 105 MMOL/L (ref 94–109)
CHOLEST SERPL-MCNC: 244 MG/DL
CO2 SERPL-SCNC: 28 MMOL/L (ref 20–32)
CREAT SERPL-MCNC: 0.82 MG/DL (ref 0.66–1.25)
FASTING STATUS PATIENT QL REPORTED: YES
GFR SERPL CREATININE-BSD FRML MDRD: >90 ML/MIN/1.73M2
GLUCOSE BLD-MCNC: 85 MG/DL (ref 70–99)
HBA1C MFR BLD: 5.2 % (ref 0–5.6)
HDLC SERPL-MCNC: 136 MG/DL
LDLC SERPL CALC-MCNC: 94 MG/DL
NONHDLC SERPL-MCNC: 108 MG/DL
POTASSIUM BLD-SCNC: 4.7 MMOL/L (ref 3.4–5.3)
PROT SERPL-MCNC: 8 G/DL (ref 6.8–8.8)
SODIUM SERPL-SCNC: 140 MMOL/L (ref 133–144)
TRIGL SERPL-MCNC: 69 MG/DL

## 2022-08-23 PROCEDURE — 90677 PCV20 VACCINE IM: CPT

## 2022-08-23 PROCEDURE — 91306 COVID-19,PF,MODERNA (18+ YRS BOOSTER .25ML): CPT

## 2022-08-23 PROCEDURE — 99396 PREV VISIT EST AGE 40-64: CPT | Mod: 25

## 2022-08-23 PROCEDURE — 90715 TDAP VACCINE 7 YRS/> IM: CPT

## 2022-08-23 PROCEDURE — 90471 IMMUNIZATION ADMIN: CPT

## 2022-08-23 PROCEDURE — 87389 HIV-1 AG W/HIV-1&-2 AB AG IA: CPT | Mod: 90 | Performed by: PATHOLOGY

## 2022-08-23 PROCEDURE — 90472 IMMUNIZATION ADMIN EACH ADD: CPT

## 2022-08-23 PROCEDURE — 80053 COMPREHEN METABOLIC PANEL: CPT | Performed by: PATHOLOGY

## 2022-08-23 PROCEDURE — 0064A COVID-19,PF,MODERNA (18+ YRS BOOSTER .25ML): CPT

## 2022-08-23 PROCEDURE — 99000 SPECIMEN HANDLING OFFICE-LAB: CPT | Performed by: PATHOLOGY

## 2022-08-23 PROCEDURE — 83036 HEMOGLOBIN GLYCOSYLATED A1C: CPT | Performed by: PATHOLOGY

## 2022-08-23 PROCEDURE — 86803 HEPATITIS C AB TEST: CPT | Mod: 90 | Performed by: PATHOLOGY

## 2022-08-23 PROCEDURE — 80061 LIPID PANEL: CPT | Performed by: PATHOLOGY

## 2022-08-23 PROCEDURE — 71271 CT THORAX LUNG CANCER SCR C-: CPT | Performed by: RADIOLOGY

## 2022-08-23 PROCEDURE — 36415 COLL VENOUS BLD VENIPUNCTURE: CPT | Performed by: PATHOLOGY

## 2022-08-23 NOTE — NURSING NOTE
Uriah Mo received the TDAP, Prevnar 20, Covid Moderna booster vaccines in clinic today at the request of Dr. Lewis. The immunization sites were cleaned with an alcohol prep wipe. The immunizations were given without incident--see immunization list for administration details. No swelling or redness was observed at the sites of injection after the immunizations were given. Pt has no history of reaction to vaccines. Pt was advised to remain in [facility] lobby for 15 minutes in case of an adverse reaction.      This service provided today was under the direct supervision of Dr. Castrejon, who was available if needed.    Alexandra Casiano, EMT at 9:57 AM on 8/23/2022

## 2022-08-23 NOTE — NURSING NOTE
Uriah Mo is a 57 year old male patient that presents today in clinic for the following:    Chief Complaint   Patient presents with     Physical     Pt here for annual physical     Musculoskeletal Problem     Pt complains of back and shoulder issues; would like to discuss imaging     The patient's allergies and medications were reviewed as noted. A set of vitals were recorded as noted without incident. The patient does not have any other questions for the provider.    AUGUSTO Henley at 8:16 AM on 8/23/2022

## 2022-08-24 LAB
HCV AB SERPL QL IA: NONREACTIVE
HIV 1+2 AB+HIV1 P24 AG SERPL QL IA: NONREACTIVE

## 2022-08-30 RX ORDER — CYCLOBENZAPRINE HCL 5 MG
5 TABLET ORAL
Qty: 30 TABLET | Refills: 0 | Status: CANCELLED | OUTPATIENT
Start: 2022-08-30

## 2022-09-03 ENCOUNTER — HEALTH MAINTENANCE LETTER (OUTPATIENT)
Age: 57
End: 2022-09-03

## 2022-10-11 ENCOUNTER — HOSPITAL ENCOUNTER (OUTPATIENT)
Dept: PHYSICAL THERAPY | Facility: REHABILITATION | Age: 57
Discharge: HOME | End: 2022-10-11
Attending: INTERNAL MEDICINE
Payer: COMMERCIAL

## 2022-10-11 DIAGNOSIS — M25.511 CHRONIC RIGHT SHOULDER PAIN: ICD-10-CM

## 2022-10-11 DIAGNOSIS — M54.6 CHRONIC RIGHT-SIDED THORACIC BACK PAIN: ICD-10-CM

## 2022-10-11 DIAGNOSIS — M54.50 CHRONIC RIGHT-SIDED LOW BACK PAIN WITHOUT SCIATICA: Primary | ICD-10-CM

## 2022-10-11 DIAGNOSIS — G89.29 CHRONIC RIGHT-SIDED LOW BACK PAIN WITHOUT SCIATICA: Primary | ICD-10-CM

## 2022-10-11 DIAGNOSIS — G89.29 CHRONIC RIGHT SHOULDER PAIN: ICD-10-CM

## 2022-10-11 DIAGNOSIS — G89.29 CHRONIC RIGHT-SIDED THORACIC BACK PAIN: ICD-10-CM

## 2022-10-11 PROCEDURE — 97161 PT EVAL LOW COMPLEX 20 MIN: CPT | Mod: GP | Performed by: PHYSICAL THERAPIST

## 2022-10-11 PROCEDURE — 97110 THERAPEUTIC EXERCISES: CPT | Mod: GP | Performed by: PHYSICAL THERAPIST

## 2022-10-11 NOTE — PROGRESS NOTES
[facility]    OUTPATIENT PHYSICAL THERAPY ORTHOPEDIC EVALUATION  PLAN OF TREATMENT FOR OUTPATIENT REHABILITATION  (COMPLETE FOR INITIAL CLAIMS ONLY)  Patient's Last Name, First Name, M.I.  YOB: 1965  Uriah Mo    Provider s Name:  [facility]   Medical Record No.  [id number]   Start of Care Date:  10/11/22   Onset Date:   (chronic since 2012, order date 8/23/22)   Type:     _X__PT   ___OT   ___SLP Medical Diagnosis:  Chronic right-sided thoracic back pain     PT Diagnosis:  thoracic pain, (R) lumbar pain, (R) shoulder pain,   Visits from SOC:  1      _________________________________________________________________________________  Plan of Treatment/Functional Goals:  manual therapy, neuromuscular re-education, ROM, strengthening, stretching           Goals  Goal Identifier: HEP  Goal Description: Patient will be independent with home exercise program and self management of symptoms in 12 weeks.       Goal Identifier: Sleeping  Goal Description: Patient will wake no more than 1x/night due to pain in 12 weeks       Goal Identifier: personal cares  Goal Description: Patient will be able to shower and brush teeth without increased pain in12 weeks.          Therapy Frequency:  1 time/week  Predicted Duration of Therapy Intervention:  up to 12 visits, time frame dependent on appt access    Gris Hall PT                 I CERTIFY THE NEED FOR THESE SERVICES FURNISHED UNDER        THIS PLAN OF TREATMENT AND WHILE UNDER MY CARE     (Physician co-signature of this document indicates review and certification of the therapy plan).                     Certification Date From:  10/11/22   Certification Date To:  01/09/23    Referring Provider:  Lazara Lewis MD    Initial Assessment        See Epic Evaluation Start of Care Date: 10/11/22                10/11/22 Jason  "  General Information   Type of Visit Initial OP Ortho PT Evaluation   Start of Care Date 10/11/22   Referring Physician Lazara Lewis MD   Patient/Family Goals Statement a diagnosis   Orders Evaluate and Treat   Orders Comment R-sided back/shoulder pain   Date of Order 08/23/22   Certification Required? Yes   Medical Diagnosis Chronic right-sided thoracic back pain   Surgical/Medical history reviewed Yes    Past Medical History:   Diagnosis Date    Anxiety     \"As a child. Maybe it is caused by pain.\"     No past surgical history on file.  Patient Active Problem List   Diagnosis   (none) - all problems resolved or deleted        Precautions/Limitations no known precautions/limitations   Presentation and Etiology   Pertinent history of current problem (include personal factors and/or comorbidities that impact the POC) Patient presents to therapy today with complaints of (R) thoracic/buttock pain and (R) UT and shoulder/prox arm pain. Pain level 8/10, range 7-10/10. Date of onset/duration of symptoms is around 2012. He was working at [employer] at that time. Reports possible hx of repetitive injury at work and strain starting lawn mower as a child. He reports periodic episodes of sharp pain with washing hair/cerv ext, with visual changes and loss of hearing. Onset was gradual. Symptoms are getting worse in the last 6 months. Patient reports a history of similar symptoms. Patient describes their previous level of function as not limited. Symptoms worsen with looking up, using (R) UE, lying on (R) side, daily activities. Symptoms improve with stretching, pool PT. Previous treatment includes pool therapy, chiropractic, PT. Functional limitations: cervical extension, cooking, starting lawn mower, sitting 30-60 min, standing 30-60 min, bending, lifting, sleeping waking 2x/night, personal cares/brushing teeth, showering and bathing.   Impairments A. Pain   Onset date of current episode/exacerbation   (chronic since 2012, " order date 8/23/22)   Chronicity Chronic   Current / Previous Interventions   Diagnostic Tests:   (See Epic for findings. Thoracic MRI neg. Shoulder x ray:Mild degenerative changes of the acromioclavicular joint. 8 mm  subacromial enthesophyte. No substantial degenerative change of the  glenohumeral joint.)   Prior Level of Function   Functional Level Prior Comment not limited   Current Level of Function   Patient role/employment history E. Unemployed   Fall Risk Screen   Fall screen completed by PT   Have you fallen 2 or more times in the past year? No   Have you fallen and had an injury in the past year? No   Is patient a fall risk? No   Abuse Screen (yes response referral indicated)   Feels Unsafe at Home or Work/School no   Feels Threatened by Someone no   Does Anyone Try to Keep You From Having Contact with Others or Doing Things Outside Your Home? no   System Outcome Measures   Outcome Measures   (KASHIF (54/90) 60%, Spadi 78% (93/120))   Lumbar Spine/SI Objective Findings   Posture decreased lumbar lordosis, (B) scapular winging,   Flexion ROM 70%   Extension ROM 75-80%   Right Side Bending ROM WNL   Left Side Bending ROM WNL   Lumbar ROM Comment (B) seated trunk rot WNL (-). No sx reproduction with trunk ROM. Patient demonstrates restricted upper trunk rot with reach/roll exercise.   Segmental Mobility No pain with moblity testing of lower ribs. No pain with PA testing of mid thoracic to lower lumbar spine.   Palpation No tenderness of lower thoracic/lumbar paraspinals, SI jts, QL.   Shoulder Objective Findings   Observation significant scapular winging   Shoulder ROM Comment No sx reproduction with A/PROM   Shoulder Special Tests Comments elbow flex 5/5, supraspinatus 5/5   Palpation NA today.   Right Shoulder Flexion AROM 142   Right Shoulder Flexion PROM WNL   Right Shoulder Abduction AROM 153   Right Shoulder Abduction PROM WNL   Right Shoulder ER AROM WNL   Right Shoulder ER PROM WNL   Right Shoulder IR  AROM IR/ext to T10   Right Shoulder IR PROM WNL   Right Shoulder Flexion Strength 5   Right Shoulder Abduction Strength 5   Right Shoulder ER Strength 5   Right Shoulder IR Strength 5   Planned Therapy Interventions   Planned Therapy Interventions manual therapy;neuromuscular re-education;ROM;strengthening;stretching   Clinical Impression   Criteria for Skilled Therapeutic Interventions Met yes, treatment indicated   PT Diagnosis thoracic pain, (R) lumbar pain, (R) shoulder pain,   Influenced by the following impairments scapular winging, normal lower trunk and shoulder ROM, normal (R) shoulder strength, decreased upper trunk rotation, no tenderness of paraspinals, QL or SI joints, no pain with mobility testing of lower ribs, lower thoracic and lumbar spine.   Functional limitations due to impairments sleeping, using (R) arm, personal cares, household and yark tasks, lifting, bending, looking up.   Clinical Presentation Stable/Uncomplicated   Clinical Decision Making (Complexity) Low complexity   Therapy Frequency 1 time/week   Predicted Duration of Therapy Intervention (days/wks) up to 12 visits, time frame dependent on appt access   Risk & Benefits of therapy have been explained Yes   Patient, Family & other staff in agreement with plan of care Yes   Clinical Impression Comments Patient presents with complaints of (R) lumbopelvic, lower thoracic and rib cage pain and tightness, (R) UT, sup shoulder and prox arm pain. Duration approx 10 years. Reports possible hx of repetitive injury at work and strain starting  as a child. Functional limitations include sleeping, using (R) arm, personal cares, household and yark tasks, lifting, bending, looking up. Findings include scapular winging, normal lower trunk and shoulder ROM, normal (R) shoulder strength, decreased upper trunk rotation, no tenderness of paraspinals, QL or SI joints, no pain with mobility testing of lower ribs, lower thoracic and lumbar spine.    Ortho Goal 1   Goal Identifier HEP   Goal Description Patient will be independent with home exercise program and self management of symptoms in 12 weeks.   Ortho Goal 2   Goal Identifier Sleeping   Goal Description Patient will wake no more than 1x/night due to pain in 12 weeks   Ortho Goal 3   Goal Identifier personal cares   Goal Description Patient will be able to shower and brush teeth without increased pain in12 weeks.   Total Evaluation Time   PT Eval, Low Complexity Minutes (88640) 45   Therapy Certification   Certification date from 10/11/22   Certification date to 01/09/23   Medical Diagnosis Chronic right-sided thoracic back pain

## 2022-10-11 NOTE — PROGRESS NOTES
"   10/11/22 1500   Signing Clinician's Name / Credentials   Signing clinician's name / credentials Gris Hall, PT   Session Number   Session Number 1/up to 12   Progress Note/Recertification   Recertification Due Date 01/09/23   Ortho Goal 1   Goal Identifier HEP   Goal Description Patient will be independent with home exercise program and self management of symptoms in 12 weeks.   Ortho Goal 2   Goal Identifier Sleeping   Goal Description Patient will wake no more than 1x/night due to pain in 12 weeks   Ortho Goal 3   Goal Identifier personal cares   Goal Description Patient will be able to shower and brush teeth without increased pain in12 weeks.   Subjective Report   Subjective Report see eval   Objective Measure 1   Details see eval   Therapeutic Procedure/exercise   Therapeutic Procedures: strength, endurance, ROM, flexibillity minutes (92580) 15   Skilled Intervention ther ex   Treatment Detail scap retraction 10 x 5\" , SL reach/roll 5-6x , supine LTR 5 x 10\"   Education   Learner Patient   Readiness Eager   Method Booklet/handout;Explanation;Demonstration   Response Demonstrates Understanding;Needs Reinforcement   Communication with other professionals   Communication with other professionals ref provider: Lazara Lewis MD   Plan   Home program scap retraction, SL reach/roll , supine LTR,   Plan for next session add rows, lat trunk stretch.   Comments   Comments from eval: Patient presents with complaints of (R) lumbopelvic, lower thoracic and rib cage pain and tightness, (R) UT, sup shoulder and prox arm pain. Duration approx 10 years. Reports possible hx of repetitive injury at work and strain starting  as a child. Functional limitations include sleeping, using (R) arm, personal cares, household and yark tasks, lifting, bending, looking up. Findings include scapular winging, normal lower trunk and shoulder ROM, normal (R) shoulder strength, decreased upper trunk rotation, no tenderness of " paraspinals, QL or SI joints, no pain with mobility testing of lower ribs, lower thoracic and lumbar spine.   Total Session Time   Timed Code Treatment Minutes 15   Total Treatment Time (sum of timed and untimed services) 60   AMBULATORY CLINICS ONLY-MEDICAL AND TREATMENT DIAGNOSIS   Medical Diagnosis Chronic right-sided thoracic back pain   PT Diagnosis thoracic pain, (R) lumbar pain, (R) shoulder pain,

## 2022-10-18 ENCOUNTER — HOSPITAL ENCOUNTER (OUTPATIENT)
Dept: PHYSICAL THERAPY | Facility: REHABILITATION | Age: 57
Discharge: HOME | End: 2022-10-18
Payer: COMMERCIAL

## 2022-10-18 DIAGNOSIS — G89.29 CHRONIC RIGHT-SIDED THORACIC BACK PAIN: Primary | ICD-10-CM

## 2022-10-18 DIAGNOSIS — M54.6 CHRONIC RIGHT-SIDED THORACIC BACK PAIN: Primary | ICD-10-CM

## 2022-10-18 DIAGNOSIS — M25.511 CHRONIC RIGHT SHOULDER PAIN: ICD-10-CM

## 2022-10-18 DIAGNOSIS — G89.29 CHRONIC RIGHT-SIDED LOW BACK PAIN WITHOUT SCIATICA: ICD-10-CM

## 2022-10-18 DIAGNOSIS — G89.29 CHRONIC RIGHT SHOULDER PAIN: ICD-10-CM

## 2022-10-18 DIAGNOSIS — M54.50 CHRONIC RIGHT-SIDED LOW BACK PAIN WITHOUT SCIATICA: ICD-10-CM

## 2022-10-18 PROCEDURE — 97110 THERAPEUTIC EXERCISES: CPT | Mod: GP | Performed by: PHYSICAL THERAPIST

## 2022-11-02 ENCOUNTER — HOSPITAL ENCOUNTER (OUTPATIENT)
Dept: PHYSICAL THERAPY | Facility: REHABILITATION | Age: 57
Discharge: HOME | End: 2022-11-02
Payer: COMMERCIAL

## 2022-11-02 DIAGNOSIS — M54.50 CHRONIC RIGHT-SIDED LOW BACK PAIN WITHOUT SCIATICA: ICD-10-CM

## 2022-11-02 DIAGNOSIS — G89.29 CHRONIC RIGHT-SIDED LOW BACK PAIN WITHOUT SCIATICA: ICD-10-CM

## 2022-11-02 DIAGNOSIS — M54.6 CHRONIC RIGHT-SIDED THORACIC BACK PAIN: Primary | ICD-10-CM

## 2022-11-02 DIAGNOSIS — G89.29 CHRONIC RIGHT-SIDED THORACIC BACK PAIN: Primary | ICD-10-CM

## 2022-11-02 PROCEDURE — 97140 MANUAL THERAPY 1/> REGIONS: CPT | Mod: GP | Performed by: PHYSICAL THERAPIST

## 2022-11-02 PROCEDURE — 97110 THERAPEUTIC EXERCISES: CPT | Mod: GP | Performed by: PHYSICAL THERAPIST

## 2022-11-09 ENCOUNTER — HOSPITAL ENCOUNTER (OUTPATIENT)
Dept: PHYSICAL THERAPY | Facility: REHABILITATION | Age: 57
Discharge: HOME | End: 2022-11-09
Payer: COMMERCIAL

## 2022-11-09 DIAGNOSIS — M54.6 CHRONIC RIGHT-SIDED THORACIC BACK PAIN: Primary | ICD-10-CM

## 2022-11-09 DIAGNOSIS — G89.29 CHRONIC RIGHT-SIDED LOW BACK PAIN WITHOUT SCIATICA: ICD-10-CM

## 2022-11-09 DIAGNOSIS — M54.50 CHRONIC RIGHT-SIDED LOW BACK PAIN WITHOUT SCIATICA: ICD-10-CM

## 2022-11-09 DIAGNOSIS — G89.29 CHRONIC RIGHT SHOULDER PAIN: ICD-10-CM

## 2022-11-09 DIAGNOSIS — G89.29 CHRONIC RIGHT-SIDED THORACIC BACK PAIN: Primary | ICD-10-CM

## 2022-11-09 DIAGNOSIS — M25.511 CHRONIC RIGHT SHOULDER PAIN: ICD-10-CM

## 2022-11-09 PROCEDURE — 97110 THERAPEUTIC EXERCISES: CPT | Mod: GP | Performed by: PHYSICAL THERAPIST

## 2022-11-16 ENCOUNTER — HOSPITAL ENCOUNTER (OUTPATIENT)
Dept: PHYSICAL THERAPY | Facility: REHABILITATION | Age: 57
Discharge: HOME | End: 2022-11-16
Payer: COMMERCIAL

## 2022-11-16 DIAGNOSIS — M54.6 CHRONIC RIGHT-SIDED THORACIC BACK PAIN: Primary | ICD-10-CM

## 2022-11-16 DIAGNOSIS — M54.50 CHRONIC RIGHT-SIDED LOW BACK PAIN WITHOUT SCIATICA: ICD-10-CM

## 2022-11-16 DIAGNOSIS — M25.511 CHRONIC RIGHT SHOULDER PAIN: ICD-10-CM

## 2022-11-16 DIAGNOSIS — G89.29 CHRONIC RIGHT-SIDED LOW BACK PAIN WITHOUT SCIATICA: ICD-10-CM

## 2022-11-16 DIAGNOSIS — G89.29 CHRONIC RIGHT SHOULDER PAIN: ICD-10-CM

## 2022-11-16 DIAGNOSIS — G89.29 CHRONIC RIGHT-SIDED THORACIC BACK PAIN: Primary | ICD-10-CM

## 2022-11-16 PROCEDURE — 97535 SELF CARE MNGMENT TRAINING: CPT | Mod: GP

## 2022-11-16 PROCEDURE — 97110 THERAPEUTIC EXERCISES: CPT | Mod: GP

## 2022-11-21 ENCOUNTER — HOSPITAL ENCOUNTER (OUTPATIENT)
Dept: PHYSICAL THERAPY | Facility: REHABILITATION | Age: 57
Discharge: HOME | End: 2022-11-21
Payer: COMMERCIAL

## 2022-11-21 DIAGNOSIS — M54.50 CHRONIC RIGHT-SIDED LOW BACK PAIN WITHOUT SCIATICA: ICD-10-CM

## 2022-11-21 DIAGNOSIS — M54.6 CHRONIC RIGHT-SIDED THORACIC BACK PAIN: Primary | ICD-10-CM

## 2022-11-21 DIAGNOSIS — G89.29 CHRONIC RIGHT SHOULDER PAIN: ICD-10-CM

## 2022-11-21 DIAGNOSIS — G89.29 CHRONIC RIGHT-SIDED THORACIC BACK PAIN: Primary | ICD-10-CM

## 2022-11-21 DIAGNOSIS — G89.29 CHRONIC RIGHT-SIDED LOW BACK PAIN WITHOUT SCIATICA: ICD-10-CM

## 2022-11-21 DIAGNOSIS — M25.511 CHRONIC RIGHT SHOULDER PAIN: ICD-10-CM

## 2022-11-21 PROCEDURE — 97110 THERAPEUTIC EXERCISES: CPT | Mod: GP

## 2022-11-21 PROCEDURE — 97112 NEUROMUSCULAR REEDUCATION: CPT | Mod: GP

## 2022-11-21 PROCEDURE — 97140 MANUAL THERAPY 1/> REGIONS: CPT | Mod: GP

## 2022-12-05 ENCOUNTER — OFFICE VISIT (OUTPATIENT)
Dept: DERMATOLOGY | Facility: CLINIC | Age: 57
End: 2022-12-05
Attending: PEDIATRICS
Payer: COMMERCIAL

## 2022-12-05 DIAGNOSIS — B36.0 TINEA VERSICOLOR DUE TO MALASSEZIA FURFUR: ICD-10-CM

## 2022-12-05 DIAGNOSIS — L82.1 SEBORRHEIC KERATOSIS: Primary | ICD-10-CM

## 2022-12-05 DIAGNOSIS — D18.01 CHERRY ANGIOMA: ICD-10-CM

## 2022-12-05 DIAGNOSIS — D22.9 MULTIPLE MELANOCYTIC NEVI: ICD-10-CM

## 2022-12-05 DIAGNOSIS — L40.0 PLAQUE PSORIASIS: ICD-10-CM

## 2022-12-05 PROCEDURE — 99204 OFFICE O/P NEW MOD 45 MIN: CPT | Performed by: DERMATOLOGY

## 2022-12-05 RX ORDER — BETAMETHASONE DIPROPIONATE 0.5 MG/G
OINTMENT, AUGMENTED TOPICAL 2 TIMES DAILY
Qty: 50 G | Refills: 4 | Status: SHIPPED | OUTPATIENT
Start: 2022-12-05

## 2022-12-05 RX ORDER — BETAMETHASONE DIPROPIONATE 0.5 MG/ML
LOTION, AUGMENTED TOPICAL 2 TIMES DAILY
Qty: 60 ML | Refills: 4 | Status: SHIPPED | OUTPATIENT
Start: 2022-12-05

## 2022-12-05 RX ORDER — KETOCONAZOLE 20 MG/ML
SHAMPOO TOPICAL
Qty: 120 ML | Refills: 11 | Status: SHIPPED | OUTPATIENT
Start: 2022-12-05

## 2022-12-05 ASSESSMENT — PAIN SCALES - GENERAL: PAINLEVEL: NO PAIN (0)

## 2022-12-05 NOTE — LETTER
2022       RE: rUiah Mo  [address]  [place] [facility] [address]     Dear Colleague,    Thank you for referring your patient, Uriah Mo, to the [facility] [place] at [facility]. Please see a copy of my visit note below.    [facility] Dermatology Note    Encounter Date: Dec 5, 2022    Dermatology Problem List:  1. Tinea versicolor: ketoconazole shampoo  2. Psoriasis: arm, scalp  - augmented betamethasone ointment/lotion    FamHx: aunt  from skin cancer    ______________________________________    Impression/Plan:  1. Reassurance provided for benign lesions not treated today including cherry angiomata, seborrheic keratoses, and banal-appearing melanocytic nevi, including the index lesion on the right cheek.    2. 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 Will uptitrate potency of topicals.  - augmented betamethasone ointment BID for forearm  - augmented betamethasone lotion BID PRN for scalp    3. Tinea versicolor: on upper torso. Start ketoconazole shampoo.  - ketoconazole shampoo      Follow-up in 1-2 years.       Staff Involved:  Staff Only    Topher Bautista MD   of Dermatology  Department of Dermatology  [facility]      CC:   Chief Complaint   Patient presents with     Derm Problem     Dorian is here today for a lesion of concern on his right cheek and a skin check        History of Present Illness:  Mr. Uriah Mo is a 57 year old male who presents as a new patient.    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  GEN: This is a well developed, well-nourished male in no acute distress, in a pleasant mood.    SKIN: Torres phototype II  - Full skin, which includes the head/face, both arms, chest, back, abdomen,both legs, genitalia and/or groin buttocks, digits and/or nails, was examined.  - red/brown macules/papules on the head/neck, trunk, extremities  - multiple stuck-on appearing tan to brown papules on the head/neck, trunk, extremities, including right cheek  - erythematous plaques studded with lichenified papules and overlying micaceous scale on the occipital scalp and left forearm  - tan slightly flaky macules on the chest and upper back  - No other lesions of concern on areas examined.     Past Medical History:   Past Medical History:   Diagnosis Date     Anxiety     \"As a child. Maybe it is caused by pain.\"     No past surgical history on file.    Social History:   reports that he has been smoking cigarettes. He has been smoking an average of 2 packs per day. He has never used smokeless tobacco. He reports current alcohol use. He reports that he does not use drugs.    Family History:  Family History   Problem Relation Age of Onset     Melanoma Maternal Aunt      Skin Cancer No family hx of        Medications:  Current Outpatient Medications   Medication Sig Dispense Refill     betamethasone dipropionate (DIPROSONE) 0.05 % external cream Apply topically 2 times daily 45 g 0     cyclobenzaprine (FLEXERIL) 5 MG tablet Take 1 tablet (5 mg) by mouth nightly as needed for muscle spasms 15 tablet 0     diclofenac (VOLTAREN) 1 % topical gel Apply 4 g topically 4 times daily 50 g 1     mometasone (ELOCON) 0.1 % external solution Apply topically daily 60 mL 0     Allergies   Allergen Reactions     Naproxen Rash         "

## 2022-12-05 NOTE — PROGRESS NOTES
[facility] Dermatology Note    Encounter Date: Dec 5, 2022    Dermatology Problem List:  1. Tinea versicolor: ketoconazole shampoo  2. Psoriasis: arm, scalp  - augmented betamethasone ointment/lotion    FamHx: aunt  from skin cancer    ______________________________________    Impression/Plan:  1. Reassurance provided for benign lesions not treated today including cherry angiomata, seborrheic keratoses, and banal-appearing melanocytic nevi, including the index lesion on the right cheek.    2. 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 Will uptitrate potency of topicals.  - augmented betamethasone ointment BID for forearm  - augmented betamethasone lotion BID PRN for scalp    3. Tinea versicolor: on upper torso. Start ketoconazole shampoo.  - ketoconazole shampoo      Follow-up in 1-2 years.       Staff Involved:  Staff Only    Topher Bautista MD   of Dermatology  Department of Dermatology  [facility]      CC:   Chief Complaint   Patient presents with     Derm Problem     Dorian is here today for a lesion of concern on his right cheek and a skin check        History of Present Illness:  Mr. Uriah Mo is a 57 year old male who presents as a new patient.    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  GEN: This is a well developed, well-nourished male in no acute distress, in a pleasant mood.    SKIN: Torres phototype II  - Full skin, which includes the head/face, both arms, chest, back, abdomen,both legs, genitalia and/or groin buttocks, digits and/or nails, was examined.  -  "red/brown macules/papules on the head/neck, trunk, extremities  - multiple stuck-on appearing tan to brown papules on the head/neck, trunk, extremities, including right cheek  - erythematous plaques studded with lichenified papules and overlying micaceous scale on the occipital scalp and left forearm  - tan slightly flaky macules on the chest and upper back  - No other lesions of concern on areas examined.     Past Medical History:   Past Medical History:   Diagnosis Date     Anxiety     \"As a child. Maybe it is caused by pain.\"     No past surgical history on file.    Social History:   reports that he has been smoking cigarettes. He has been smoking an average of 2 packs per day. He has never used smokeless tobacco. He reports current alcohol use. He reports that he does not use drugs.    Family History:  Family History   Problem Relation Age of Onset     Melanoma Maternal Aunt      Skin Cancer No family hx of        Medications:  Current Outpatient Medications   Medication Sig Dispense Refill     betamethasone dipropionate (DIPROSONE) 0.05 % external cream Apply topically 2 times daily 45 g 0     cyclobenzaprine (FLEXERIL) 5 MG tablet Take 1 tablet (5 mg) by mouth nightly as needed for muscle spasms 15 tablet 0     diclofenac (VOLTAREN) 1 % topical gel Apply 4 g topically 4 times daily 50 g 1     mometasone (ELOCON) 0.1 % external solution Apply topically daily 60 mL 0     Allergies   Allergen Reactions     Naproxen Rash                 "

## 2022-12-05 NOTE — NURSING NOTE
Dermatology Rooming Note    Uriah Mo's goals for this visit include:   Chief Complaint   Patient presents with     Derm Problem     Dorian is here today for a lesion of concern on his right cheek and a skin check      Jerry Vargas CMA

## 2022-12-22 NOTE — PROGRESS NOTES
"[facility]    Outpatient Physical Therapy Discharge Note  Patient: Uriah Mo  : 1965    Beginning/End Dates of Reporting Period:  10/11/22-22    Referring Provider: Dontae Abarca MD    Therapy Diagnosis: thoracic pain, right lumbar pain, right shoulder pain     Client Self Report: pt reports he had 2 full night's sleep since last visit, attributing improved sleep to positioning and stretching prior to bed. Still feels \"cable-like\" discomfort/tightness throughout right lateral flank to scapula.    Objective Measurements:  Objective Measure: breathing  Details: decreased lateral excursion of right ribcage  Objective Measure: thoracic and rib mobility  Details: left rib anterior rotation, reduced thoracic mobility and rib mobility right>left  Objective Measure: palpation  Details: hypertonicity and discomfort with palpation throughout right QL  Objective Measure: scapular mobility  Details: repeated shoulder flexion-lag in downward rotation on the right compared to left     Goals:  Goal Identifier HEP   Goal Description Patient will be independent with home exercise program and self management of symptoms in 12 weeks.   Target Date 23   Date Met      Progress (detail required for progress note): continuing to progress HEP     Goal Identifier Sleeping   Goal Description Patient will wake no more than 1x/night due to pain in 12 weeks   Target Date 23   Date Met      Progress (detail required for progress note): progressing, had 2 full night's sleep     Goal Identifier personal cares   Goal Description Patient will prepare meals for 15 min with less pain in upper R back   Target Date 23   Date Met      Progress (detail required for progress note):       Plan:  Discharge from therapy.    Discharge:    Reason for Discharge: Patient has failed to schedule further " appointments.    Equipment Issued: [email or web address]    Discharge Plan: Patient to continue home program.

## 2022-12-22 NOTE — ADDENDUM NOTE
Encounter addended by: Lisette De La Paz, PT on: 12/22/2022 7:46 AM   Actions taken: Episode resolved, Clinical Note Signed

## 2023-03-28 ENCOUNTER — PATIENT OUTREACH (OUTPATIENT)
Dept: DERMATOLOGY | Facility: CLINIC | Age: 58
End: 2023-03-28
Payer: COMMERCIAL

## 2023-03-28 NOTE — TELEPHONE ENCOUNTER
Attempted to reach patient to schedule follow up in the Dermatology Clinic.  No answer,  LM on VM to call office and Qiandao message sent..    Schedule with Dr. Topher Bautista 12/2023.

## 2023-06-03 ENCOUNTER — HEALTH MAINTENANCE LETTER (OUTPATIENT)
Age: 58
End: 2023-06-03

## 2024-03-08 NOTE — PROGRESS NOTES
"  [facility]     Patient Name: Uriah Mo  YOB: 1965  MRN: [id number]    Date of Service: May 10, 2022  Chief Complaint: establish care, shoulder/back issue          HISTORY OF PRESENT ILLNESS:    Uriah Mo is a 55 yo man with PMHx depression, anxiety who presents today for a shoulder/back issue.     Has had a history of chronic R shoulder, upper back, and chest wall pain/weakness after a work injury ~15 years ago. Pain is in figure 8 formation along R lateral thoracic wall that then wraps around and hurts his shoulder. He describes this as very tight and is \"constantly under traction\" as if he's \"wearing suspenders.\" This is affecting his day-to-day with cooking, writing his name. The pain is worse with any movement. The pain improves with rest. He does not use pain medications regularly, sometimes NSAIDs. Tries to stay away from opioids. In the past has tried cyclobenzaprine, but has avoided taking medications as he didn't want to exacerbate the pain with excessive movement. Only can walk a couple hundred yards and then is in too much pain the rest of the day. No sensation changes, weakness in his UE.     He has previously been seen by Orthopedics 5/2017. He had an MRI that reported mild tendinopathy of the supraspinatus and infraspinatus and mild AC joint degenerative changes. The orthopedic surgeon at that time felt his symptoms didn't correlate with his imaging and he was unsure the pain was directly related to the shoulder and recommended PCP referral. He also saw Neurology in 3/2017 who felt this was myofascial pain and referred him to Sports Medicine (seen 3/2017) and pool therapy. He ultimately saw Dr. Arango (Pain Medicine) and had a thoracic nerve block in 10/2017, which he did not think was incredibly helpful. He has trialed PT many times, including aquatic therapy. He had a lot of difficulty with dealing with insurance and worker's compensation (for which he is no longer " "actively pursuing and is not currently working) so he has not been following with physician's regularly since 2017.     Additionally, he has had UE rashes that flare with stress that he notes have been occurring around the same duration of his pain. Denies associated diarrhea/GI symptoms, no small joint pain. Denies fevers, recent weight loss. Has tried anti-inflammatory diets in the past.            PAST MEDICAL HISTORY:     Past Medical History:   Diagnosis Date     Anxiety     \"As a child. Maybe it is caused by pain.\"            PAST SURGICAL HISTORY:   No past surgical history on file.         ALLERGIES:      Allergies   Allergen Reactions     Naproxen Rash            HOME MEDICATIONS:     Current Outpatient Medications   Medication     betamethasone dipropionate (DIPROSONE) 0.05 % external cream     cyclobenzaprine (FLEXERIL) 5 MG tablet     diclofenac (VOLTAREN) 1 % topical gel     No current facility-administered medications for this visit.            FAMILY HISTORY:   No family history on file.         SOCIAL HISTORY:     Social History     Tobacco Use     Smoking status: Current Every Day Smoker     Packs/day: 2.00     Tobacco comment: Smoked for 34 years plus   Substance Use Topics     Alcohol use: Yes     Comment: Whisky 6 shots/daily     Drug use: No          REVIEW OF SYSTEMS:     10 point ROS was negative except as noted in HPI         PHYSICAL EXAM:   Vitals: There were no vitals taken for this visit.    Wt Readings from Last 4 Encounters:   07/19/17 71.2 kg (157 lb)       GENERAL: Alert, interactive, NAD  HEENT: NCAT, EOMI  LUNGS: breathing comfortably on RA  ABDOMEN: Soft, nontender, nondistended, no rebound or guarding.  MUSCULOSKELETAL: No tenderness to palpation of right lateral side wall; muscle tightness apparent. No spinal tenderness.   EXTREMITIES: No LE edema bilaterally, 2+ DP and radial pulses bialterally  SKIN: Warm and dry, patches of inflamed skin bilaterally on lateral elbows with mild " purple hue. Also excoriations on RUE over shoulder.  NEURO: A&O, moving all 4 limbs spontaneously   PSYCH: Appropriate mood, normal speech, linear thought.            DATA:     Laboratory Data:  No recent relevant lab data.     Imaging:  XR SHOULDER RT AP/Y/AXILLARY   3/3/2017 2:46 PM     INDICATION: Shoulder pain. Right shoulder pain   COMPARISON: None.     FINDINGS: Degenerative change right AC joint. Negative for fracture or   dislocation      [facility]   MRI CERVICAL SPINE W/O CONTRAST   1/6/2017 7:00 AM     FINDINGS: Straightening of the usual cervical lordosis. Trace   anterolisthesis of C3. No significant subluxation. No edema. Benign marrow.    Normal spinal cord signal and caliber.     Findings at specific levels:     C2-C3: Preserved interspace. No protrusion. Mild facet degeneration. No   stenosis.     C3-C4: Preserved interspace. No protrusion. Minor uncovertebral and facet   joint degeneration. No stenosis.     C4-C5: Preserved interspace. No protrusion. Mild right facet degeneration.   No stenosis.     C5-C6: Slight interspace narrowing. No protrusion. Mild right facet   degeneration. No stenosis.     C6-C7: Preserved interspace. No protrusion. Minor right facet degeneration.    No stenosis.     C7-T1: Preserved interspace. No protrusion. Minor facet degeneration. No   stenosis.     CONCLUSION:   1.  Mild degenerative changes. No stenosis.   2.  Normal spinal cord signal and caliber.   3.  No edema.    [facility]   MRI RIGHT SHOULDER   4/9/2015 8:00 AM     INDICATION: Right shoulder pain. Rotator cuff weakness. Decreased strength   and decreased range of motion.   TECHNIQUE: Routine.   COMPARISON: None.     FINDINGS:   ROTATOR CUFF: Mild tendinopathy of the supraspinatus and infraspinatus   without evidence of tearing. Trace fluid in the subacromial-subdeltoid   bursa. No muscle atrophy.     Subscapularis and teres minor are intact with no tendinopathy or tear.    Normal muscle bulk and signal.     ACROMIOCLAVICULAR REGION: Minimal degenerative changes at the   acromioclavicular joint. Type I acromion with anterior downsloping.     GLENOHUMERAL JOINT: Biceps tendon is intact without tendinopathy or tear.   Labrum is intact. Cartilage is smooth. No joint effusion.     BONES AND SOFT TISSUES: No muscle edema. No fracture.     CONCLUSION:   1.  Mild tendinopathy of the supraspinatus and infraspinatus without   evidence of tear.     2.  Minimal degenerative changes at the acromioclavicular joint. Mild   anterior downsloping of a type I acromion with trace fluid in the   subacromial-subdeltoid bursa.            ASSESSMENT & PLAN:     Uriah was seen today for recheck medication and musculoskeletal problem.    Diagnoses and all orders for this visit:    Chronic right-sided thoracic back pain, unclear etiology  Chronic right shoulder pain  Mr. Mo has had longstanding right thoracic wall pain & right shoulder pain of unclear etiology despite extensive evaluation and imaging by Orthopedics, Neurology, Pain Medicine and has trialed numerous therapies including medications, physical (and pool) therapy, and thoracic nerve blocks. It is unclear what the underlying diagnosis is, but wonder if he is having multiple different issues that exacerbate each other. Considered intercostal neuralgia, right shoulder arthritis, thoracic herniated disc. Initially had concern for possible rheumatologic disease given rashes as well, but pain does not quite correlate with classic inflammatory arthropathies and rash more consistent with unspecified dermatitis (does not overtly look like psoriasis, dermatitis herpetiformis, etc). Given he has not had imaging in ~5 years, suggest thoracic MRI to r/o thoracic pathology like herniated disc, as well as re-image the right shoulder to see if any interval changes. In the meantime, can trial pain control with diclofenac gel, OTC lidocaine patches, and PRN  cyclobenzaprine. Counseled the patient on side effects of sedation & recommend only taking at night as needed.   -     diclofenac (VOLTAREN) 1 % topical gel; Apply 4 g topically 4 times daily  -     cyclobenzaprine (FLEXERIL) 5 MG tablet; Take 1 tablet (5 mg) by mouth 2 times daily as needed for muscle spasms  -     MRI Thoracic Spine w/o & w contrast; Future  -     XR Shoulder Right 2 Views; Future  - Consider PT referral at next visit if desired by patient  - Follow-up in 2 weeks after imaging    Dermatitis  Pt has non-specific dermatitis on bilateral UE, currently healing. Does not look classic for other rashes such as dermatitis herpetiformis or psoriasis. Will trial steroid & re-assess.   -     betamethasone dipropionate (DIPROSONE) 0.05 % external cream; Apply topically 2 times daily    Return to clinic: 2 weeks after imaging complete to help determine next steps    Patient care plan discussed with attending physician, Dr. Mayer, who agreed with above.     Lazara Lewis MD  Internal Medicine, PGY-1     Detail Level: Zone

## 2024-07-06 ENCOUNTER — HEALTH MAINTENANCE LETTER (OUTPATIENT)
Age: 59
End: 2024-07-06

## 2025-07-13 ENCOUNTER — HEALTH MAINTENANCE LETTER (OUTPATIENT)
Age: 60
End: 2025-07-13